# Patient Record
Sex: FEMALE | Race: WHITE | NOT HISPANIC OR LATINO | Employment: FULL TIME | URBAN - METROPOLITAN AREA
[De-identification: names, ages, dates, MRNs, and addresses within clinical notes are randomized per-mention and may not be internally consistent; named-entity substitution may affect disease eponyms.]

---

## 2017-05-18 ENCOUNTER — HOSPITAL ENCOUNTER (OUTPATIENT)
Dept: MAMMOGRAPHY | Facility: HOSPITAL | Age: 47
Discharge: HOME | End: 2017-05-18
Attending: FAMILY MEDICINE

## 2017-05-18 DIAGNOSIS — Z12.31 VISIT FOR SCREENING MAMMOGRAM: ICD-10-CM

## 2018-06-01 ENCOUNTER — HOSPITAL ENCOUNTER (OUTPATIENT)
Dept: MAMMOGRAPHY | Facility: CLINIC | Age: 48
Discharge: HOME | End: 2018-06-01
Attending: FAMILY MEDICINE

## 2018-06-01 DIAGNOSIS — Z12.31 VISIT FOR SCREENING MAMMOGRAM: ICD-10-CM

## 2018-07-09 ENCOUNTER — RECORDS - HEALTHEAST (OUTPATIENT)
Dept: ADMINISTRATIVE | Facility: OTHER | Age: 48
End: 2018-07-09

## 2018-07-09 ENCOUNTER — RECORDS - HEALTHEAST (OUTPATIENT)
Dept: LAB | Facility: CLINIC | Age: 48
End: 2018-07-09

## 2018-07-09 LAB
TSH SERPL DL<=0.005 MIU/L-ACNC: 1.26 UIU/ML (ref 0.3–5)
VIT B12 SERPL-MCNC: >2000 PG/ML (ref 213–816)

## 2018-11-07 ENCOUNTER — RECORDS - HEALTHEAST (OUTPATIENT)
Dept: ADMINISTRATIVE | Facility: OTHER | Age: 48
End: 2018-11-07

## 2018-11-07 ENCOUNTER — RECORDS - HEALTHEAST (OUTPATIENT)
Dept: LAB | Facility: CLINIC | Age: 48
End: 2018-11-07

## 2018-11-07 LAB
ALBUMIN UR-MCNC: NEGATIVE MG/DL
APPEARANCE UR: CLEAR
BACTERIA #/AREA URNS HPF: ABNORMAL HPF
BILIRUB UR QL STRIP: NEGATIVE
C REACTIVE PROTEIN LHE: <0.1 MG/DL (ref 0–0.8)
CK SERPL-CCNC: 50 U/L (ref 30–190)
COLOR UR AUTO: ABNORMAL
ERYTHROCYTE [SEDIMENTATION RATE] IN BLOOD BY WESTERGREN METHOD: 8 MM/HR (ref 0–20)
GLUCOSE UR STRIP-MCNC: NEGATIVE MG/DL
HGB UR QL STRIP: NEGATIVE
KETONES UR STRIP-MCNC: NEGATIVE MG/DL
LEUKOCYTE ESTERASE UR QL STRIP: NEGATIVE
NITRATE UR QL: NEGATIVE
PH UR STRIP: 8 [PH] (ref 4.5–8)
RBC #/AREA URNS AUTO: ABNORMAL HPF
SP GR UR STRIP: 1.01 (ref 1–1.03)
SQUAMOUS #/AREA URNS AUTO: ABNORMAL LPF
UROBILINOGEN UR STRIP-ACNC: ABNORMAL
WBC #/AREA URNS AUTO: ABNORMAL HPF

## 2018-11-08 LAB
ANA SER QL: 2.6 U
B BURGDOR IGG+IGM SER QL: 0.02 INDEX VALUE
CCP AB SER IA-ACNC: <0.5 U/ML
DNA (DS) ANTIBODY - HISTORICAL: 8 IU

## 2018-11-12 ENCOUNTER — RECORDS - HEALTHEAST (OUTPATIENT)
Dept: ADMINISTRATIVE | Facility: OTHER | Age: 48
End: 2018-11-12

## 2018-11-14 ENCOUNTER — COMMUNICATION - HEALTHEAST (OUTPATIENT)
Dept: ADMINISTRATIVE | Facility: CLINIC | Age: 48
End: 2018-11-14

## 2018-12-12 ENCOUNTER — OFFICE VISIT - HEALTHEAST (OUTPATIENT)
Dept: RHEUMATOLOGY | Facility: CLINIC | Age: 48
End: 2018-12-12

## 2018-12-12 ENCOUNTER — COMMUNICATION - HEALTHEAST (OUTPATIENT)
Dept: TELEHEALTH | Facility: CLINIC | Age: 48
End: 2018-12-12

## 2018-12-12 DIAGNOSIS — M25.50 POLYARTHRALGIA: ICD-10-CM

## 2018-12-12 LAB
C3 SERPL-MCNC: 109 MG/DL (ref 83–177)
C4 SERPL-MCNC: 27 MG/DL (ref 19–59)
RHEUMATOID FACT SERPL-ACNC: <15 IU/ML (ref 0–30)
URATE SERPL-MCNC: 4 MG/DL (ref 2–7.5)

## 2018-12-12 RX ORDER — CYCLOBENZAPRINE HCL 5 MG
5 TABLET ORAL PRN
Refills: 3 | Status: SHIPPED | COMMUNITY
Start: 2018-09-19

## 2018-12-12 RX ORDER — DIAZEPAM 5 MG
5 TABLET ORAL PRN
Refills: 2 | Status: SHIPPED | COMMUNITY
Start: 2018-11-27

## 2018-12-12 RX ORDER — VENLAFAXINE HYDROCHLORIDE 37.5 MG/1
37.5 CAPSULE, EXTENDED RELEASE ORAL DAILY
Refills: 11 | Status: SHIPPED | COMMUNITY
Start: 2018-11-16

## 2018-12-12 ASSESSMENT — MIFFLIN-ST. JEOR: SCORE: 1102.16

## 2018-12-13 ENCOUNTER — COMMUNICATION - HEALTHEAST (OUTPATIENT)
Dept: RHEUMATOLOGY | Facility: CLINIC | Age: 48
End: 2018-12-13

## 2018-12-13 LAB
HBV SURFACE AG SERPL QL IA: NEGATIVE
HCV AB SERPL QL IA: NEGATIVE

## 2018-12-14 LAB
ANCA IGG TITR SER IF: NORMAL {TITER}
B19V IGG SER IA-ACNC: 0.31 IV
B19V IGM SER IA-ACNC: 0.26 IV

## 2018-12-18 LAB
JO-1 AUTOANTIBODIES - HISTORICAL: 0 EU
SCL-70 AUTOANTIBODIES - HISTORICAL: 0 EU
SM (SMITH AUTOANTIBODIES - HISTORICAL: 0 EU
SM/RNP AUTOANTIBODIES - HISTORICAL: 1 EU
SS-A/RO AUTOANTIBODIES - HISTORICAL: 1 EU
SS-B/LA AUTOANTIBODIES - HISTORICAL: 0 EU

## 2019-01-10 ENCOUNTER — OFFICE VISIT - HEALTHEAST (OUTPATIENT)
Dept: RHEUMATOLOGY | Facility: CLINIC | Age: 49
End: 2019-01-10

## 2019-01-10 DIAGNOSIS — M25.50 POLYARTHRALGIA: ICD-10-CM

## 2019-01-24 ENCOUNTER — HOSPITAL ENCOUNTER (OUTPATIENT)
Dept: NUCLEAR MEDICINE | Facility: HOSPITAL | Age: 49
Discharge: HOME | End: 2019-01-24
Attending: INTERNAL MEDICINE

## 2019-01-24 DIAGNOSIS — M25.50 POLYARTHRALGIA: ICD-10-CM

## 2019-02-06 ENCOUNTER — COMMUNICATION - HEALTHEAST (OUTPATIENT)
Dept: RHEUMATOLOGY | Facility: CLINIC | Age: 49
End: 2019-02-06

## 2019-03-22 ENCOUNTER — RECORDS - HEALTHEAST (OUTPATIENT)
Dept: LAB | Facility: CLINIC | Age: 49
End: 2019-03-22

## 2019-03-22 LAB
CHOLEST SERPL-MCNC: 152 MG/DL
FASTING STATUS PATIENT QL REPORTED: NO
HDLC SERPL-MCNC: 50 MG/DL
LDLC SERPL CALC-MCNC: 64 MG/DL
TRIGL SERPL-MCNC: 191 MG/DL

## 2019-04-18 ENCOUNTER — RECORDS - HEALTHEAST (OUTPATIENT)
Dept: LAB | Facility: CLINIC | Age: 49
End: 2019-04-18

## 2019-04-18 LAB
BASOPHILS # BLD AUTO: 0 THOU/UL (ref 0–0.2)
BASOPHILS NFR BLD AUTO: 1 % (ref 0–2)
C REACTIVE PROTEIN LHE: <0.1 MG/DL (ref 0–0.8)
EOSINOPHIL # BLD AUTO: 0 THOU/UL (ref 0–0.4)
EOSINOPHIL NFR BLD AUTO: 0 % (ref 0–6)
ERYTHROCYTE [DISTWIDTH] IN BLOOD BY AUTOMATED COUNT: 11.9 % (ref 11–14.5)
HCT VFR BLD AUTO: 38.9 % (ref 35–47)
HGB BLD-MCNC: 12.4 G/DL (ref 12–16)
LYMPHOCYTES # BLD AUTO: 1 THOU/UL (ref 0.8–4.4)
LYMPHOCYTES NFR BLD AUTO: 21 % (ref 20–40)
MCH RBC QN AUTO: 29.8 PG (ref 27–34)
MCHC RBC AUTO-ENTMCNC: 31.9 G/DL (ref 32–36)
MCV RBC AUTO: 94 FL (ref 80–100)
MONOCYTES # BLD AUTO: 0.4 THOU/UL (ref 0–0.9)
MONOCYTES NFR BLD AUTO: 9 % (ref 2–10)
NEUTROPHILS # BLD AUTO: 3.2 THOU/UL (ref 2–7.7)
NEUTROPHILS NFR BLD AUTO: 69 % (ref 50–70)
PLATELET # BLD AUTO: 302 THOU/UL (ref 140–440)
PMV BLD AUTO: 11.2 FL (ref 8.5–12.5)
RBC # BLD AUTO: 4.16 MILL/UL (ref 3.8–5.4)
RHEUMATOID FACT SERPL-ACNC: <15 IU/ML (ref 0–30)
URATE SERPL-MCNC: 4.4 MG/DL (ref 2–7.5)
WBC: 4.6 THOU/UL (ref 4–11)

## 2019-04-19 LAB — B BURGDOR IGG+IGM SER QL: 0.06 INDEX VALUE

## 2019-04-22 LAB — ANA SER QL: 1.7 U

## 2019-04-24 LAB
B LOCUS: NORMAL
B27TEST METHOD: NORMAL

## 2019-05-06 ENCOUNTER — COMMUNICATION - HEALTHEAST (OUTPATIENT)
Dept: RHEUMATOLOGY | Facility: CLINIC | Age: 49
End: 2019-05-06

## 2019-08-27 ENCOUNTER — RECORDS - HEALTHEAST (OUTPATIENT)
Dept: LAB | Facility: CLINIC | Age: 49
End: 2019-08-27

## 2019-08-27 LAB
AST SERPL W P-5'-P-CCNC: 15 U/L (ref 0–40)
BASOPHILS # BLD AUTO: 0 THOU/UL (ref 0–0.2)
BASOPHILS NFR BLD AUTO: 1 % (ref 0–2)
CREAT SERPL-MCNC: 0.68 MG/DL (ref 0.6–1.1)
EOSINOPHIL # BLD AUTO: 0 THOU/UL (ref 0–0.4)
EOSINOPHIL NFR BLD AUTO: 0 % (ref 0–6)
ERYTHROCYTE [DISTWIDTH] IN BLOOD BY AUTOMATED COUNT: 13.3 % (ref 11–14.5)
GFR SERPL CREATININE-BSD FRML MDRD: >60 ML/MIN/1.73M2
HCT VFR BLD AUTO: 36.2 % (ref 35–47)
HGB BLD-MCNC: 11.2 G/DL (ref 12–16)
LYMPHOCYTES # BLD AUTO: 1.1 THOU/UL (ref 0.8–4.4)
LYMPHOCYTES NFR BLD AUTO: 28 % (ref 20–40)
MCH RBC QN AUTO: 28.8 PG (ref 27–34)
MCHC RBC AUTO-ENTMCNC: 30.9 G/DL (ref 32–36)
MCV RBC AUTO: 93 FL (ref 80–100)
MONOCYTES # BLD AUTO: 0.4 THOU/UL (ref 0–0.9)
MONOCYTES NFR BLD AUTO: 11 % (ref 2–10)
NEUTROPHILS # BLD AUTO: 2.3 THOU/UL (ref 2–7.7)
NEUTROPHILS NFR BLD AUTO: 60 % (ref 50–70)
PLATELET # BLD AUTO: 288 THOU/UL (ref 140–440)
PMV BLD AUTO: 11.1 FL (ref 8.5–12.5)
RBC # BLD AUTO: 3.89 MILL/UL (ref 3.8–5.4)
WBC: 3.9 THOU/UL (ref 4–11)

## 2019-10-01 ENCOUNTER — RECORDS - HEALTHEAST (OUTPATIENT)
Dept: LAB | Facility: CLINIC | Age: 49
End: 2019-10-01

## 2019-10-01 LAB
AST SERPL W P-5'-P-CCNC: 15 U/L (ref 0–40)
BASOPHILS # BLD AUTO: 0 THOU/UL (ref 0–0.2)
BASOPHILS NFR BLD AUTO: 1 % (ref 0–2)
CREAT SERPL-MCNC: 0.68 MG/DL (ref 0.6–1.1)
EOSINOPHIL # BLD AUTO: 0 THOU/UL (ref 0–0.4)
EOSINOPHIL NFR BLD AUTO: 0 % (ref 0–6)
ERYTHROCYTE [DISTWIDTH] IN BLOOD BY AUTOMATED COUNT: 13.9 % (ref 11–14.5)
GFR SERPL CREATININE-BSD FRML MDRD: >60 ML/MIN/1.73M2
HCT VFR BLD AUTO: 33.9 % (ref 35–47)
HGB BLD-MCNC: 10.5 G/DL (ref 12–16)
LYMPHOCYTES # BLD AUTO: 1.1 THOU/UL (ref 0.8–4.4)
LYMPHOCYTES NFR BLD AUTO: 27 % (ref 20–40)
MCH RBC QN AUTO: 29.4 PG (ref 27–34)
MCHC RBC AUTO-ENTMCNC: 31 G/DL (ref 32–36)
MCV RBC AUTO: 95 FL (ref 80–100)
MONOCYTES # BLD AUTO: 0.4 THOU/UL (ref 0–0.9)
MONOCYTES NFR BLD AUTO: 10 % (ref 2–10)
NEUTROPHILS # BLD AUTO: 2.6 THOU/UL (ref 2–7.7)
NEUTROPHILS NFR BLD AUTO: 62 % (ref 50–70)
PLATELET # BLD AUTO: 273 THOU/UL (ref 140–440)
PMV BLD AUTO: 11.4 FL (ref 8.5–12.5)
RBC # BLD AUTO: 3.57 MILL/UL (ref 3.8–5.4)
WBC: 4.2 THOU/UL (ref 4–11)

## 2019-10-16 ENCOUNTER — RECORDS - HEALTHEAST (OUTPATIENT)
Dept: LAB | Facility: CLINIC | Age: 49
End: 2019-10-16

## 2019-10-19 LAB — BACTERIA SPEC CULT: NORMAL

## 2020-04-24 ENCOUNTER — RECORDS - HEALTHEAST (OUTPATIENT)
Dept: LAB | Facility: CLINIC | Age: 50
End: 2020-04-24

## 2020-04-26 LAB — BACTERIA SPEC CULT: NORMAL

## 2020-08-25 ENCOUNTER — HOSPITAL ENCOUNTER (OUTPATIENT)
Dept: MAMMOGRAPHY | Facility: CLINIC | Age: 50
Discharge: HOME | End: 2020-08-25
Attending: FAMILY MEDICINE

## 2020-08-25 DIAGNOSIS — Z12.31 VISIT FOR SCREENING MAMMOGRAM: ICD-10-CM

## 2020-08-27 ENCOUNTER — RECORDS - HEALTHEAST (OUTPATIENT)
Dept: ADMINISTRATIVE | Facility: OTHER | Age: 50
End: 2020-08-27

## 2020-09-03 ENCOUNTER — HOSPITAL ENCOUNTER (OUTPATIENT)
Dept: MAMMOGRAPHY | Facility: CLINIC | Age: 50
Discharge: HOME | End: 2020-09-03
Attending: FAMILY MEDICINE

## 2020-09-03 DIAGNOSIS — N64.89 BREAST ASYMMETRY: ICD-10-CM

## 2021-05-25 ENCOUNTER — RECORDS - HEALTHEAST (OUTPATIENT)
Dept: ADMINISTRATIVE | Facility: CLINIC | Age: 51
End: 2021-05-25

## 2021-05-26 ENCOUNTER — RECORDS - HEALTHEAST (OUTPATIENT)
Dept: ADMINISTRATIVE | Facility: CLINIC | Age: 51
End: 2021-05-26

## 2021-05-27 ENCOUNTER — RECORDS - HEALTHEAST (OUTPATIENT)
Dept: ADMINISTRATIVE | Facility: CLINIC | Age: 51
End: 2021-05-27

## 2021-05-28 ENCOUNTER — RECORDS - HEALTHEAST (OUTPATIENT)
Dept: ADMINISTRATIVE | Facility: CLINIC | Age: 51
End: 2021-05-28

## 2021-05-31 ENCOUNTER — RECORDS - HEALTHEAST (OUTPATIENT)
Dept: ADMINISTRATIVE | Facility: CLINIC | Age: 51
End: 2021-05-31

## 2021-06-01 ENCOUNTER — RECORDS - HEALTHEAST (OUTPATIENT)
Dept: ADMINISTRATIVE | Facility: CLINIC | Age: 51
End: 2021-06-01

## 2021-06-02 VITALS — HEIGHT: 63 IN | WEIGHT: 112 LBS | BODY MASS INDEX: 19.84 KG/M2

## 2021-06-02 VITALS — WEIGHT: 112 LBS | BODY MASS INDEX: 19.84 KG/M2

## 2021-06-05 ENCOUNTER — HEALTH MAINTENANCE LETTER (OUTPATIENT)
Age: 51
End: 2021-06-05

## 2021-06-22 NOTE — PROGRESS NOTES
ASSESSMENT AND PLAN:  Migdalia Leon Born 48 y.o. female is seen here on 12/12/18 for evaluation of polyarthralgias, polymyalgias, with some signals which require further looking into for systemic inflammatory syndrome.  For example, despite a negative anti-CCP antibody rheumatoid arthritis not to be ruled out.  This is discussed with her.  Further workup as noted.  In the interim I have asked her to consider taking a soft small dose of prednisone at 7.5 mg daily for the next 4 weeks.  We will meet him at the end of that period.  Diagnoses and all orders for this visit:    Polyarthralgia  -     Rheumatoid Factor Quant  -     Hepatitis C Antibody (Anti-HCV)  -     JHONNY (Antibodies to Extractable Nuclear Antigens) Profile  -     Complement, C'3  -     Complement, C'4  -     Uric Acid  -     Anti-Neutrophil Cytoplasmic Antibody, IgG (ANCA IFA)  -     Parvovirus B19 Antibodies, IgG and IgM  -     Hepatitis B Surface Antigen (HBsAG)      HISTORY OF PRESENTING ILLNESS:  Migdalia Leon Born, 48 y.o., female is here for evaluation of painful joints, myalgias.  She dates this back to approximately 4 months ago.  She feels that the beginning happened in the wrists bilaterally.  This was a gradual onset pain which soon engulfed other areas including her hands, forearms, as well as calves and thighs.  This pain is quite significant.  This is especially more troublesome at night and first thing in the morning.  It takes her quite some time even an hour plus before she gets limbered up in the morning.  She is often woken up from sleep because of the severity of pain.  She noted the morning symptoms including stiffness and any part of the day otherwise.  She has found some use with ibuprofen and Tylenol.  In the past she has taken prednisone but that was for back spasms.  The most recent one was in 2017.  She reports no personal or family history of psoriasis ulcerative colitis Crohn's disease.  She has negative GAGANDEEP, anti-CCP  "antibody, Lyme Lyme serology.  Her sed rate and CRP were normal as checked in her primary physician's office.  She noted that after having been in a house for 20 years they moved in May and this was emotionally demanding move and physically challenging to.  The symptoms as noted above then began after this move in July or thereabouts.    Further historical information and ADL limitations as noted in the multidimensional health assessment questionnaire attached in the EMR. Rest of the 13 system ROS is negative.     ALLERGIES:Amoxicillin; Tizanidine; and Naproxen    PAST MEDICAL/ACTIVE PROBLEMS/MEDICATION/ FAMILY HISTORY/SOCIAL DATA:  The patient has a family history of  No past medical history on file.  Social History     Tobacco Use   Smoking Status Never Smoker   Smokeless Tobacco Never Used     There is no problem list on file for this patient.    Current Outpatient Medications   Medication Sig Dispense Refill     cyclobenzaprine (FLEXERIL) 5 MG tablet Take 5 mg by mouth as needed.  3     diazePAM (VALIUM) 5 MG tablet Take 5 mg by mouth as needed.  2     MAGNESIUM ORAL Take by mouth.       UNABLE TO FIND Tumeric .       venlafaxine (EFFEXOR-XR) 37.5 MG 24 hr capsule Take 37.5 mg by mouth daily.  11     No current facility-administered medications for this visit.        COMPREHENSIVE EXAMINATION:  Vitals:    12/12/18 1505   BP: 100/70   Patient Site: Right Arm   Patient Position: Sitting   Cuff Size: Adult Regular   Pulse: 92   Weight: 112 lb (50.8 kg)   Height: 5' 3\" (1.6 m)     A well appearing alert oriented female. Vital data as noted above. Her eyes without inflammation/scleromalacia. ENTwithout oral mucositis, thrush, nasal deformity, external ear redness, deformity. Her neck is without lymphadenopathy and supple. Lungs normal sounds, no pleural rub. Heart auscultation normal rate, rhythm; no pericardial rub and murmurs. Abdomen soft, non tender, no organomegaly. Skin examined for heliotrope, malar area " eruption, lupus pernio, periungual erythema, sclerodactyly, papules, erythema nodosum, purpura, nail pitting, onycholysis, and obvious psoriasis lesion. Neurological examination shows normal alertness, speech, facial symmetry, tone and power in upper and lower extremities, Tinel's and Phalen's at wrist and gait. The joint examination is performed for swelling, tenderness, warmth, erythema, and range of motion in the following joints: DIPs, PIPs, MCPs, wrists, first CMC's, elbows, shoulders, hips, knees, ankles, feet; spine for range of motion and paraspinal muscles for tenderness. The salient normal / abnormal findings are appended.  She has tenderness in her wrists.  She is tender in the thigh muscles of the calves.  There is no other area of synovitis in the palpable joints of upper or lower extremities.  She does not have tenderness across the trapezius along the paraspinal region.  She does not have dactylitis, enthesitis such as in the tibial tubercle, and Achilles.    LAB / IMAGING DATA:  No results found for: ALT, ALBUMIN, CREATININE    No results found for: WBC, HGB, PLT    Lab Results   Component Value Date    SEDRATE 8 11/07/2018

## 2021-06-23 NOTE — PROGRESS NOTES
ASSESSMENT AND PLAN:  Migdalia Leon Born 48 y.o. female is seen here on 01/10/19 for evaluation of polyarthralgias, polymyalgias, with some signals which require further looking into for systemic inflammatory syndrome.  She took prednisone 7-1/2 mg for nearly while it caused her jitteriness and sleeplessness it did not help her she has, arthralgias one bit.  She is to discontinue this.  There have been no serologic features of autoimmunity, acute phase response.  We will take x-rays of the hands, wrists today.  Arrange for three-phase bone scan.  We will meet in the next few weeks.         Diagnoses and all orders for this visit:    Polyarthralgia  -     XR Hands Bilateral 3 or More VWS  -     NM Bone Scan 3 Phase With Whole Body  -     XR Hands Bilateral 3 or More VWS      HISTORY OF PRESENTING ILLNESS:  Migdalia Leon Born, 48 y.o., female is here for follow-up of painful joints, myalgias.  She was started on 7-1/2 mg of prednisone.  This was nearly 30 days ago.  While it caused her side effects such as jitteriness sleeplessness it has not helped her joint symptoms at all.  She continues to hurt in her wrist and hands.  She has the symptoms going on now for the past 5 months daily.  She feels that the beginning happened in the wrists bilaterally.  This was a gradual onset pain which soon engulfed other areas including her hands, forearms, as well as calves and thighs.  This pain is quite significant.  This is especially more troublesome at night and first thing in the morning.  It takes her quite some time even an hour plus before she gets limbered up in the morning.  She is often woken up from sleep because of the severity of pain.  She noted the morning symptoms including stiffness and any part of the day otherwise.  She has found some use with ibuprofen and Tylenol.  In the past she has taken prednisone but that was for back spasms.  The most recent one was in 2017.  She reports no personal or family  history of psoriasis ulcerative colitis Crohn's disease.  She has negative GAGANDEEP, anti-CCP antibody, Lyme Lyme serology.  Her sed rate and CRP were normal as checked in her primary physician's office.  She noted that after having been in a house for 20 years they moved in May and this was emotionally demanding move and physically challenging to.  The symptoms as noted above then began after this move in July or thereabouts.    Further historical information and ADL limitations as noted in the multidimensional health assessment questionnaire attached in the EMR.  ALLERGIES:Amoxicillin; Tizanidine; and Naproxen    PAST MEDICAL/ACTIVE PROBLEMS/MEDICATION/ FAMILY HISTORY/SOCIAL DATA:  The patient has a family history of  No past medical history on file.  Social History     Tobacco Use   Smoking Status Never Smoker   Smokeless Tobacco Never Used     There is no problem list on file for this patient.    Current Outpatient Medications   Medication Sig Dispense Refill     cyclobenzaprine (FLEXERIL) 5 MG tablet Take 5 mg by mouth as needed.  3     diazePAM (VALIUM) 5 MG tablet Take 5 mg by mouth as needed.  2     MAGNESIUM ORAL Take by mouth.       predniSONE (DELTASONE) 2.5 MG tablet 7.5 mg/d prednisone PO for 1 month, reduce to 5 mg/d for the next month, and then reduce to 2.5 mg/d for the third month and then stop.. 90 tablet 2     UNABLE TO FIND Tumeric .       venlafaxine (EFFEXOR-XR) 37.5 MG 24 hr capsule Take 37.5 mg by mouth daily.  11     No current facility-administered medications for this visit.      DETAILED EXAMINATION  01/10/19  :  Vitals:    01/10/19 1107   BP: 114/68   Patient Site: Right Arm   Patient Position: Sitting   Cuff Size: Adult Regular   Pulse: 92   Weight: 112 lb (50.8 kg)     Alert oriented. Head including the face is examined for malar rash, heliotropes, scarring, lupus pernio. Eyes examined for redness such as in episcleritis/scleritis, periorbital lesions.   Neck/ Face examined for parotid  gland swelling, range of motion of neck.  Left upper and lower and right upper and lower extremities examined for tenderness, swelling, warmth of the appendicular joints, range of motion, edema, rash.  Some of the important findings included: She has residual tenderness in the wrists bilaterally reduced range of motion of the right side no swelling warmth.  None of the palpable joints of upper extremities show synovitis.  There is full range of motion of the shoulders.  Knees without swelling warmth or JLT   .    LAB / IMAGING DATA:  No results found for: ALT, ALBUMIN, CREATININE    No results found for: WBC, HGB, PLT    Lab Results   Component Value Date    RF <15.0 12/12/2018    SEDRATE 8 11/07/2018

## 2021-07-21 ENCOUNTER — RECORDS - HEALTHEAST (OUTPATIENT)
Dept: ADMINISTRATIVE | Facility: CLINIC | Age: 51
End: 2021-07-21

## 2021-09-25 ENCOUNTER — HEALTH MAINTENANCE LETTER (OUTPATIENT)
Age: 51
End: 2021-09-25

## 2021-11-20 ENCOUNTER — HEALTH MAINTENANCE LETTER (OUTPATIENT)
Age: 51
End: 2021-11-20

## 2022-12-26 ENCOUNTER — HEALTH MAINTENANCE LETTER (OUTPATIENT)
Age: 52
End: 2022-12-26

## 2023-04-16 ENCOUNTER — HEALTH MAINTENANCE LETTER (OUTPATIENT)
Age: 53
End: 2023-04-16

## 2023-06-19 ENCOUNTER — ANCILLARY ORDERS (OUTPATIENT)
Dept: MAMMOGRAPHY | Facility: CLINIC | Age: 53
End: 2023-06-19

## 2023-06-19 DIAGNOSIS — Z12.31 VISIT FOR SCREENING MAMMOGRAM: ICD-10-CM

## 2023-07-11 ENCOUNTER — ANCILLARY PROCEDURE (OUTPATIENT)
Dept: MAMMOGRAPHY | Facility: CLINIC | Age: 53
End: 2023-07-11
Attending: OBSTETRICS & GYNECOLOGY
Payer: COMMERCIAL

## 2023-07-11 DIAGNOSIS — Z12.31 VISIT FOR SCREENING MAMMOGRAM: ICD-10-CM

## 2023-07-11 PROCEDURE — 77067 SCR MAMMO BI INCL CAD: CPT

## 2023-07-14 ENCOUNTER — LAB REQUISITION (OUTPATIENT)
Dept: LAB | Facility: CLINIC | Age: 53
End: 2023-07-14

## 2023-07-14 DIAGNOSIS — Z12.4 ENCOUNTER FOR SCREENING FOR MALIGNANT NEOPLASM OF CERVIX: ICD-10-CM

## 2023-07-14 PROCEDURE — G0145 SCR C/V CYTO,THINLAYER,RESCR: HCPCS | Performed by: OBSTETRICS & GYNECOLOGY

## 2023-07-14 PROCEDURE — 87624 HPV HI-RISK TYP POOLED RSLT: CPT | Performed by: OBSTETRICS & GYNECOLOGY

## 2023-07-20 LAB
BKR LAB AP GYN ADEQUACY: NORMAL
BKR LAB AP GYN INTERPRETATION: NORMAL
BKR LAB AP HPV REFLEX: NORMAL
BKR LAB AP LMP: NORMAL
BKR LAB AP PREVIOUS ABNL DX: NORMAL
BKR LAB AP PREVIOUS ABNORMAL: NORMAL
PATH REPORT.COMMENTS IMP SPEC: NORMAL
PATH REPORT.COMMENTS IMP SPEC: NORMAL
PATH REPORT.RELEVANT HX SPEC: NORMAL

## 2023-07-24 LAB
HUMAN PAPILLOMA VIRUS 16 DNA: NEGATIVE
HUMAN PAPILLOMA VIRUS 18 DNA: NEGATIVE
HUMAN PAPILLOMA VIRUS FINAL DIAGNOSIS: NORMAL
HUMAN PAPILLOMA VIRUS OTHER HR: NEGATIVE

## 2023-09-12 ENCOUNTER — LAB REQUISITION (OUTPATIENT)
Dept: LAB | Facility: CLINIC | Age: 53
End: 2023-09-12

## 2023-09-12 DIAGNOSIS — M79.7 FIBROMYALGIA: ICD-10-CM

## 2023-09-12 LAB
CRP SERPL-MCNC: <3 MG/L
ERYTHROCYTE [SEDIMENTATION RATE] IN BLOOD BY WESTERGREN METHOD: 11 MM/HR (ref 0–30)

## 2023-09-12 PROCEDURE — 86140 C-REACTIVE PROTEIN: CPT | Performed by: FAMILY MEDICINE

## 2023-09-12 PROCEDURE — 85652 RBC SED RATE AUTOMATED: CPT | Performed by: FAMILY MEDICINE

## 2023-09-12 PROCEDURE — 86225 DNA ANTIBODY NATIVE: CPT | Performed by: FAMILY MEDICINE

## 2023-09-12 PROCEDURE — 86200 CCP ANTIBODY: CPT | Performed by: FAMILY MEDICINE

## 2023-09-12 PROCEDURE — 86431 RHEUMATOID FACTOR QUANT: CPT | Performed by: FAMILY MEDICINE

## 2023-09-12 PROCEDURE — 86038 ANTINUCLEAR ANTIBODIES: CPT | Performed by: FAMILY MEDICINE

## 2023-09-13 LAB
CCP AB SER IA-ACNC: 1.1 U/ML
DSDNA AB SER-ACNC: 3.2 IU/ML
RHEUMATOID FACT SER NEPH-ACNC: <6 IU/ML

## 2023-09-15 LAB
ANA PAT SER IF-IMP: ABNORMAL
ANA PAT SER IF-IMP: ABNORMAL
ANA SER QL IF: POSITIVE
ANA TITR SER IF: ABNORMAL {TITER}
ANA TITR SER IF: ABNORMAL {TITER}

## 2024-06-23 ENCOUNTER — HEALTH MAINTENANCE LETTER (OUTPATIENT)
Age: 54
End: 2024-06-23

## 2024-08-01 ENCOUNTER — ANCILLARY PROCEDURE (OUTPATIENT)
Dept: MAMMOGRAPHY | Facility: CLINIC | Age: 54
End: 2024-08-01
Attending: FAMILY MEDICINE
Payer: COMMERCIAL

## 2024-08-01 DIAGNOSIS — Z12.31 VISIT FOR SCREENING MAMMOGRAM: ICD-10-CM

## 2024-08-01 PROCEDURE — 77063 BREAST TOMOSYNTHESIS BI: CPT

## 2024-09-01 ENCOUNTER — HEALTH MAINTENANCE LETTER (OUTPATIENT)
Age: 54
End: 2024-09-01

## 2025-08-04 ENCOUNTER — ANCILLARY PROCEDURE (OUTPATIENT)
Dept: MAMMOGRAPHY | Facility: CLINIC | Age: 55
End: 2025-08-04
Attending: FAMILY MEDICINE
Payer: COMMERCIAL

## 2025-08-04 DIAGNOSIS — Z12.31 VISIT FOR SCREENING MAMMOGRAM: ICD-10-CM

## 2025-08-04 PROCEDURE — 77063 BREAST TOMOSYNTHESIS BI: CPT

## 2025-08-07 ENCOUNTER — LAB REQUISITION (OUTPATIENT)
Dept: LAB | Facility: CLINIC | Age: 55
End: 2025-08-07

## 2025-08-07 DIAGNOSIS — R63.4 ABNORMAL WEIGHT LOSS: ICD-10-CM

## 2025-08-07 LAB
ALBUMIN SERPL BCG-MCNC: 4.5 G/DL (ref 3.5–5.2)
ALP SERPL-CCNC: 27 U/L (ref 40–150)
ALT SERPL W P-5'-P-CCNC: 15 U/L (ref 0–50)
ANION GAP SERPL CALCULATED.3IONS-SCNC: 11 MMOL/L (ref 7–15)
AST SERPL W P-5'-P-CCNC: 22 U/L (ref 0–45)
BILIRUB SERPL-MCNC: 0.4 MG/DL
BUN SERPL-MCNC: 21.7 MG/DL (ref 6–20)
CALCIUM SERPL-MCNC: 9.3 MG/DL (ref 8.8–10.4)
CHLORIDE SERPL-SCNC: 102 MMOL/L (ref 98–107)
CREAT SERPL-MCNC: 0.61 MG/DL (ref 0.51–0.95)
EGFRCR SERPLBLD CKD-EPI 2021: >90 ML/MIN/1.73M2
FERRITIN SERPL-MCNC: 57 NG/ML (ref 11–328)
GLUCOSE SERPL-MCNC: 89 MG/DL (ref 70–99)
HCO3 SERPL-SCNC: 27 MMOL/L (ref 22–29)
IRON BINDING CAPACITY (ROCHE): 244 UG/DL (ref 240–430)
IRON SATN MFR SERPL: 49 % (ref 15–46)
IRON SERPL-MCNC: 119 UG/DL (ref 37–145)
POTASSIUM SERPL-SCNC: 4 MMOL/L (ref 3.4–5.3)
PREALB SERPL-MCNC: 19.6 MG/DL (ref 20–40)
PROT SERPL-MCNC: 6.3 G/DL (ref 6.4–8.3)
SODIUM SERPL-SCNC: 140 MMOL/L (ref 135–145)
TSH SERPL DL<=0.005 MIU/L-ACNC: 1.11 UIU/ML (ref 0.3–4.2)

## 2025-08-07 PROCEDURE — 83550 IRON BINDING TEST: CPT | Performed by: FAMILY MEDICINE

## 2025-08-07 PROCEDURE — 84443 ASSAY THYROID STIM HORMONE: CPT | Performed by: FAMILY MEDICINE

## 2025-08-07 PROCEDURE — 84134 ASSAY OF PREALBUMIN: CPT | Performed by: FAMILY MEDICINE

## 2025-08-07 PROCEDURE — 82728 ASSAY OF FERRITIN: CPT | Performed by: FAMILY MEDICINE

## 2025-08-07 PROCEDURE — 82947 ASSAY GLUCOSE BLOOD QUANT: CPT | Performed by: FAMILY MEDICINE

## 2025-08-11 ENCOUNTER — ANCILLARY PROCEDURE (OUTPATIENT)
Dept: MAMMOGRAPHY | Facility: CLINIC | Age: 55
End: 2025-08-11
Attending: FAMILY MEDICINE
Payer: COMMERCIAL

## 2025-08-11 DIAGNOSIS — R92.8 ABNORMAL MAMMOGRAM: ICD-10-CM

## 2025-08-11 PROCEDURE — 77061 BREAST TOMOSYNTHESIS UNI: CPT | Mod: RT

## 2025-08-25 ENCOUNTER — LAB REQUISITION (OUTPATIENT)
Dept: LAB | Facility: CLINIC | Age: 55
End: 2025-08-25

## 2025-08-25 DIAGNOSIS — R59.1 GENERALIZED ENLARGED LYMPH NODES: ICD-10-CM

## 2025-08-25 PROCEDURE — 85025 COMPLETE CBC W/AUTO DIFF WBC: CPT | Performed by: FAMILY MEDICINE

## 2025-08-25 PROCEDURE — 86618 LYME DISEASE ANTIBODY: CPT | Performed by: FAMILY MEDICINE

## 2025-08-26 LAB
BASOPHILS # BLD AUTO: 0.04 10E3/UL (ref 0–0.2)
BASOPHILS NFR BLD AUTO: 0.9 %
EOSINOPHIL # BLD AUTO: <0.03 10E3/UL (ref 0–0.7)
EOSINOPHIL NFR BLD AUTO: 0 %
ERYTHROCYTE [DISTWIDTH] IN BLOOD BY AUTOMATED COUNT: 11.9 % (ref 10–15)
HCT VFR BLD AUTO: 39.1 % (ref 35–47)
HGB BLD-MCNC: 11.9 G/DL (ref 11.7–15.7)
IMM GRANULOCYTES # BLD: <0.03 10E3/UL
IMM GRANULOCYTES NFR BLD: 0.4 %
LYMPHOCYTES # BLD AUTO: 1.23 10E3/UL (ref 0.8–5.3)
LYMPHOCYTES NFR BLD AUTO: 26.3 %
MCH RBC QN AUTO: 32 PG (ref 26.5–33)
MCHC RBC AUTO-ENTMCNC: 30.4 G/DL (ref 31.5–36.5)
MCV RBC AUTO: 105.1 FL (ref 78–100)
MONOCYTES # BLD AUTO: 0.56 10E3/UL (ref 0–1.3)
MONOCYTES NFR BLD AUTO: 12 %
NEUTROPHILS # BLD AUTO: 2.83 10E3/UL (ref 1.6–8.3)
NEUTROPHILS NFR BLD AUTO: 60.4 %
NRBC # BLD AUTO: <0.03 10E3/UL
NRBC BLD AUTO-RTO: 0 /100
PLATELET # BLD AUTO: 226 10E3/UL (ref 150–450)
RBC # BLD AUTO: 3.72 10E6/UL (ref 3.8–5.2)
WBC # BLD AUTO: 4.68 10E3/UL (ref 4–11)

## 2025-08-27 LAB — B BURGDOR IGG+IGM SER QL: 0.05
